# Patient Record
Sex: FEMALE | Race: WHITE | Employment: OTHER | URBAN - METROPOLITAN AREA
[De-identification: names, ages, dates, MRNs, and addresses within clinical notes are randomized per-mention and may not be internally consistent; named-entity substitution may affect disease eponyms.]

---

## 2023-02-06 ENCOUNTER — HOSPITAL ENCOUNTER (OUTPATIENT)
Dept: PHYSICAL THERAPY | Age: 78
Discharge: HOME | End: 2023-02-06
Payer: MEDICARE

## 2023-02-06 PROCEDURE — 97110 THERAPEUTIC EXERCISES: CPT

## 2023-02-06 PROCEDURE — 97016 VASOPNEUMATIC DEVICE THERAPY: CPT

## 2023-02-06 PROCEDURE — 97161 PT EVAL LOW COMPLEX 20 MIN: CPT

## 2023-02-06 NOTE — PROGRESS NOTES
PT INITIAL EVALUATION NOTE - [facility] 2-15    Patient Name: Nancy Garcia  Date:2023  : 1945  [x]  Patient  Verified  Payor: Prem Abbot / Plan: [address] [place] [address] HMO / Product Type: Managed Care Medicare /    In time: 2:40 p  Out time: 3:45 p  Total Treatment Time (min): 65  Total Timed Codes (min): 10  1:1 Treatment Time ( only): 10   Visit #: 1     Treatment Area: Right leg pain [M79.604]    SUBJECTIVE  Pain Level (0-10 scale): Current- 0, Best- 0, Worst- 5    Any medication changes, allergies to medications, adverse drug reactions, diagnosis change, or new procedure performed?: [] No    [x] Yes (see summary sheet for update)  Subjective:    Chief complaint: L leg pain. Pain is intermittent, starts at knee and travels up the side of her leg. 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 Her scar is lateral.  Pain began 3 weeks ago and is described as an aching. She has to use her cane when walking out of concern for pain. She was previously not using a SPC for ambulation after her surgery. She has found some exercises from the internet- quad sets and glute sets, hip extension which are helping her pain. She is having pain 4 days/week. Currently still doing her HEP s/p hip replacement- Ankle pumps, heels slides, clamshells, heel raises, hip abduction      Aggravating factors: walking  Easing factors: resting   Imaging/tests: denies  Numbness/tingling: denies     PLOF: Ambulating independently 1 mile/day   Mechanism of Injury: Insidious onset   Previous Treatment/Compliance: Previous PT following hip replacement   PMHx/Surgical Hx: s/p L THR 2022  Work Hx: Retired   Living Situation: Stairs at home without difficulty   Pt Goals: \"Being able to walk without a cane and not have the pain out of nowhere. \"  Barriers: None noted   Motivation: Motivated   Substance use: None noted   Cognition: A & O x 4        OBJECTIVE/EXAMINATION Gait: R varus, wide base gait  Functional Mobility:  Squat: reduced range, absent hip hinge   TTP: distal ITB    Sensation: Intact and equal bilaterally     MMT:     R  L  Hip flexion   4  3+  Knee extension 5  5   Knee flexion   4+  4  Ankle DF  4+  4  Ankle PF  5  4+  Hip ER   4  3+  Hip abduction   4  4  Hip extension   4  3    Lower Extremity AROM:        R  L  Hip Flexion  125  120  Knee Flexion  110  135  Knee Extension -8  -10        Balance:      R  L  SLS   21  30           Flexibility (restriction):     R  L  Hamstrings  Max  Mod   Gastrocnemius NT  NT   Iliopsoas  Mod  Max  Quadriceps  Mod  Max       Modality rationale: decrease inflammation and decrease pain to improve the patients ability to transfer, ambulate, perform ADL's   Min Type Additional Details    [] Estim: []Att   []Unatt        []TENS instruct                  []IFC  []Premod   []NMES                     []Other:  []w/US   []w/ice   []w/heat  Position:  Location:    []  Traction: [] Cervical       []Lumbar                       [] Prone          []Supine                       []Intermittent   []Continuous Lbs:  [] before manual  [] after manual  []w/heat    []  Ultrasound: []Continuous   [] Pulsed at:                           []1MHz   []3MHz Location:  W/cm2:    [] Paraffin         Location:   []w/heat    []  Ice     []  Heat  []  Ice massage Position:  Location:    []  Laser  []  Other: Position:  Location:   15   [x]  Vasopneumatic Device Pressure:       [x] lo [] med [] hi   Temperature: 34     [x] Skin assessment post-treatment:  [x]intact []redness- no adverse reaction    []redness - adverse reaction:     10 min Therapeutic Exercise:  [x] See flow sheet :   Rationale: increase ROM and increase strength to improve the patients ability to transfer, ambulate, perform ADL's             With   [] TE   [] TA   [] Neuro   [] SC   [] other: Patient Education: [x] Review HEP    [] Progressed/Changed HEP based on:   [] positioning   [] body mechanics   [] transfers   [] heat/ice application    [] other:      Other Objective/Functional Measures: FOTO Functional Measure: 55/100                Pain Level (0-10 scale) post treatment: 0    ASSESSMENT/Changes in Function:     [x]  See Plan of [facility], DPT 2/6/2023

## 2023-02-07 NOTE — THERAPY EVALUATION
Physical Therapy at [facility],   a part of  [facility]  [facility]  Isis Calloway  Phone: [phone]  Fax: [phone]    Plan of Care/Statement of Necessity for Physical Therapy Services  2-15    Patient name: Vanessa Whitfield  : 1945  Provider#: [id number]  Referral source: Briana Odom, [facility] Gopi Saenz      Medical/Treatment Diagnosis: Pain in left hip [M25.552]     Prior Hospitalization: see medical history     Comorbidities:  s/p L [facility] 2022  Prior Level of Function: Ambulating independently 1 mile/day   Medications: Verified on Patient Summary List  Start of Care: 23      Onset Date:    The Plan of Care and following information is based on the information from the initial evaluation. Assessment/ key information: Patient is a pleasant 69year old female presenting with L knee pain, sx suggestive of ITB syndrome 2/2 residual L hip weakness s/p TKR . Current symptoms limit functional ability to ambulate community distances. Marked deficits include L>R LE weakness, R>L knee flexion/extension AROM restriction, balance deficits and impaired gait/squatting mechanics. Patient will benefit from skilled PT to address all previously listed deficits. Evaluation Complexity History MEDIUM  Complexity : 1-2 comorbidities / personal factors will impact the outcome/ POC ; Examination MEDIUM Complexity : 3 Standardized tests and measures addressing body structure, function, activity limitation and / or participation in recreation  ;Presentation MEDIUM Complexity : Evolving with changing characteristics  ; Clinical Decision Making MEDIUM Complexity : FOTO score of 26-74  Overall Complexity Rating: LOW     Problem List: pain affecting function, decrease ROM, decrease strength, impaired gait/ balance, decrease ADL/ functional abilitiies, decrease activity tolerance, decrease flexibility/ joint mobility, and decrease transfer abilities   Treatment Plan may include any combination of the following: Therapeutic exercise, Neuromuscular reeducation, Manual therapy, Therapeutic activity, Self care/home management, Electric stim unattended , Vasopneumatic device, Gait training, Ultrasound, and Needle insertion w/o injection (1 or 2 muscles)  Patient / Family readiness to learn indicated by: asking questions, trying to perform skills, and interest  Persons(s) to be included in education: patient (P)  Barriers to Learning/Limitations: None  Patient Goal (s): Being able to walk without a cane and not have the pain out of nowhere  Patient Self Reported Health Status: good  Rehabilitation Potential: excellent    Short Term Goals: To be accomplished in 4 weeks:  Patient will be independent with initial HEP in order to transition to general wellness program and ease maneuvering stairs. Patient will demonstrate a full squat with correct mechanics to ease performance of household chores. Patient will demonstrate B SLS for 30 seconds to decrease fall risk when maneuvering stairs. Long Term Goals: To be accomplished in 12 weeks:  Patient will report worst pain of 2/10 or better to increase QOL and tolerance for sleeping through the night. Patient will be able to ambulate 300 feet without AD and no evidence for imbalance to increase access to the community. Patient will be able to squat and lift 10# to ease carrying groceries. Frequency / Duration: Patient to be seen 1-2 times per week for up to 12 weeks. Patient/ Caregiver education and instruction: self care, activity modification, and exercises    [x]  Plan of care has been reviewed with PTA      Certification Period: 2/6/23-5/6/23  James Weldon DPT 2/7/2023     ________________________________________________________________________    I certify that the above Therapy Services are being furnished while the patient is under my care.  I agree with the treatment plan and certify that this therapy is necessary.     [de-identified] Signature:____________________  Date:____________Time: _________         ASHISH Bolden

## 2023-02-16 ENCOUNTER — HOSPITAL ENCOUNTER (OUTPATIENT)
Dept: PHYSICAL THERAPY | Age: 78
Discharge: HOME | End: 2023-02-16
Payer: MEDICARE

## 2023-02-16 PROCEDURE — 97016 VASOPNEUMATIC DEVICE THERAPY: CPT

## 2023-02-16 PROCEDURE — 97112 NEUROMUSCULAR REEDUCATION: CPT

## 2023-02-16 PROCEDURE — 97110 THERAPEUTIC EXERCISES: CPT

## 2023-02-16 NOTE — PROGRESS NOTES
PT DAILY TREATMENT NOTE - [facility] 2-15    Patient Name: Danyelle Mcconnell  Date:2023  : 1945  [x]  Patient  Verified  Payor: Zohreh Machado / Plan: [address] [place] [address] HMO / Product Type: Managed Care Medicare /    In time: 9:40a  Out time: 10:40a  Total Treatment Time (min): 60  Total Timed Codes (min): 45  1:1 Treatment Time ( [address] only): 39   Visit #:  2    Treatment Area: Pain in left hip [M25.552]    SUBJECTIVE  Pain Level (0-10 scale): \"good\"  Any medication changes, allergies to medications, adverse drug reactions, diagnosis change, or new procedure performed?: [x] No    [] Yes (see summary sheet for update)  Subjective functional status/changes:   [] No changes reported  Patient reports yesterday she had a bad day with pain along the anterior thigh, but feels good today.     OBJECTIVE    Modality rationale: decrease inflammation and decrease pain to improve the patients ability to sit, stand, lift, carry, reach, ambulate, and complete ADL's   Min Type Additional Details       [] Estim: []Att   []Unatt    []TENS instruct                  []IFC  []Premod   []NMES                     []Other:  []w/US   []w/ice   []w/heat  Position:  Location:       []  Traction: [] Cervical       []Lumbar                       [] Prone          []Supine                       []Intermittent   []Continuous Lbs:  [] before manual  [] after manual  []w/heat    []  Ultrasound: []Continuous   [] Pulsed                       at: []1MHz   []3MHz Location:  W/cm2:    [] Paraffin         Location:   []w/heat    []  Ice     []  Heat  []  Ice massage Position:  Location:    []  Laser  []  Other: Position:  Location:   15   [x]  Vasopneumatic Device Pressure:       [x] lo [] med [] hi   Temperature: 34     [x] Skin assessment post-treatment:  [x]intact []redness- no adverse reaction    []redness - adverse reaction:     35 min Therapeutic Exercise:  [x] See flow sheet :   Rationale: increase ROM and increase strength to improve the patients ability to sit, stand, lift, carry, reach, ambulate, and complete ADL's      10 min Neuromuscular Re-education:  [x]  See flow sheet :   Rationale: improve coordination, improve balance, and increase proprioception  to improve the patients ability to sit, stand, lift, carry, reach, ambulate, and complete ADL's            With   [] TE   [] TA   [] Neuro   [] SC   [] other: Patient Education: [x] Review HEP    [] Progressed/Changed HEP based on:   [] positioning   [] body mechanics   [] transfers   [] heat/ice application    [] other:      Other Objective/Functional Measures:   No pain with advanced exercises, mod fatigue noted    Mod verbal cues for proper form      Pain Level (0-10 scale) post treatment: 0    ASSESSMENT/Changes in Function:     Patient will continue to benefit from skilled PT services to modify and progress therapeutic interventions, address functional mobility deficits, address ROM deficits, address strength deficits, analyze and address soft tissue restrictions, analyze and cue movement patterns, and analyze and modify body mechanics/ergonomics to attain remaining goals. []  See Plan of Care  []  See progress note/recertification  []  See Discharge Summary         Progress towards goals / Updated goals:  Patient demonstrates good overall tolerance for advanced interventions with mod fatigue. Patient will do well with continued progression as tolerated. Short Term Goals: To be accomplished in 4 weeks:  Patient will be independent with initial HEP in order to transition to general wellness program and ease maneuvering stairs. Patient will demonstrate a full squat with correct mechanics to ease performance of household chores. Patient will demonstrate B SLS for 30 seconds to decrease fall risk when maneuvering stairs. Long Term Goals:  To be accomplished in 12 weeks:  Patient will report worst pain of 2/10 or better to increase QOL and tolerance for sleeping through the night. Patient will be able to ambulate 300 feet without AD and no evidence for imbalance to increase access to the community.     Patient will be able to squat and lift 10# to ease carrying groceries    PLAN  [x]  Upgrade activities as tolerated     [x]  Continue plan of care  [x]  Update interventions per flow sheet       []  Discharge due to:_  []  Other:_      Sharath Duffy, PTA 2/16/2023

## 2023-02-22 ENCOUNTER — HOSPITAL ENCOUNTER (OUTPATIENT)
Dept: PHYSICAL THERAPY | Age: 78
Discharge: HOME | End: 2023-02-22
Payer: MEDICARE

## 2023-02-22 PROCEDURE — 97110 THERAPEUTIC EXERCISES: CPT

## 2023-02-22 PROCEDURE — 97112 NEUROMUSCULAR REEDUCATION: CPT

## 2023-02-22 PROCEDURE — 97016 VASOPNEUMATIC DEVICE THERAPY: CPT

## 2023-02-22 NOTE — PROGRESS NOTES
PT DAILY TREATMENT NOTE - [facility] 2-15    Patient Name: Ramu May  Date:2023  : 1945  [x]  Patient  Verified  Payor: Lala Carlisle / Plan: [address] [place] [address] HMO / Product Type: Managed Care Medicare /    In time: 10:02 a  Out time: 11:00 a  Total Treatment Time (min): 58  Total Timed Codes (min): 43  1:1 Treatment Time ( [address] only): 37   Visit #:  3    Treatment Area: Pain in left hip [M25.552]    SUBJECTIVE  Pain Level (0-10 scale): 0.5   Any medication changes, allergies to medications, adverse drug reactions, diagnosis change, or new procedure performed?: [x] No    [] Yes (see summary sheet for update)  Subjective functional status/changes:   [] No changes reported  Patient reports that she feels like she is getting better and stronger. She has some aching today because of the damp weather.      OBJECTIVE    Modality rationale: decrease inflammation and decrease pain to improve the patients ability to sit, stand, lift, carry, reach, ambulate, and complete ADL's   Min Type Additional Details       [] Estim: []Att   []Unatt    []TENS instruct                  []IFC  []Premod   []NMES                     []Other:  []w/US   []w/ice   []w/heat  Position:  Location:       []  Traction: [] Cervical       []Lumbar                       [] Prone          []Supine                       []Intermittent   []Continuous Lbs:  [] before manual  [] after manual  []w/heat    []  Ultrasound: []Continuous   [] Pulsed                       at: []1MHz   []3MHz Location:  W/cm2:    [] Paraffin         Location:   []w/heat    []  Ice     []  Heat  []  Ice massage Position:  Location:    []  Laser  []  Other: Position:  Location:   15   [x]  Vasopneumatic Device Pressure:       [x] lo [] med [] hi   Temperature: 34     [x] Skin assessment post-treatment:  [x]intact []redness- no adverse reaction    []redness - adverse reaction:     35 min Therapeutic Exercise:  [x] See flow sheet :   Rationale: increase ROM and increase strength to improve the patients ability to sit, stand, lift, carry, reach, ambulate, and complete ADL's      8 min Neuromuscular Re-education:  [x]  See flow sheet :   Rationale: improve coordination, improve balance, and increase proprioception  to improve the patients ability to sit, stand, lift, carry, reach, ambulate, and complete ADL's            With   [] TE   [] TA   [] Neuro   [] SC   [] other: Patient Education: [x] Review HEP    [] Progressed/Changed HEP based on:   [] positioning   [] body mechanics   [] transfers   [] heat/ice application    [] other:      Other Objective/Functional Measures: No pain throughout session     Pain Level (0-10 scale) post treatment: 0    ASSESSMENT/Changes in Function:     Patient will continue to benefit from skilled PT services to modify and progress therapeutic interventions, address functional mobility deficits, address ROM deficits, address strength deficits, analyze and address soft tissue restrictions, analyze and cue movement patterns, and analyze and modify body mechanics/ergonomics to attain remaining goals. []  See Plan of Care  []  See progress note/recertification  []  See Discharge Summary         Progress towards goals / Updated goals:  Patient demonstrating good initial progress toward goals     Short Term Goals: To be accomplished in 4 weeks:  Patient will be independent with initial HEP in order to transition to general wellness program and ease maneuvering stairs. Patient will demonstrate a full squat with correct mechanics to ease performance of household chores. Patient will demonstrate B SLS for 30 seconds to decrease fall risk when maneuvering stairs. Long Term Goals: To be accomplished in 12 weeks:  Patient will report worst pain of 2/10 or better to increase QOL and tolerance for sleeping through the night. Patient will be able to ambulate 300 feet without AD and no evidence for imbalance to increase access to the community. Patient will be able to squat and lift 10# to ease carrying groceries    PLAN  [x]  Upgrade activities as tolerated     [x]  Continue plan of care  [x]  Update interventions per flow sheet       []  Discharge due to:_  []  Other:_      Suzy Aranda DPT 2/22/2023

## 2023-03-01 ENCOUNTER — HOSPITAL ENCOUNTER (OUTPATIENT)
Dept: PHYSICAL THERAPY | Age: 78
Discharge: HOME | End: 2023-03-01
Payer: MEDICARE

## 2023-03-01 PROCEDURE — 97110 THERAPEUTIC EXERCISES: CPT | Performed by: PHYSICAL MEDICINE & REHABILITATION

## 2023-03-01 PROCEDURE — 97016 VASOPNEUMATIC DEVICE THERAPY: CPT | Performed by: PHYSICAL MEDICINE & REHABILITATION

## 2023-03-01 PROCEDURE — 97112 NEUROMUSCULAR REEDUCATION: CPT | Performed by: PHYSICAL MEDICINE & REHABILITATION

## 2023-03-01 NOTE — PROGRESS NOTES
PT DAILY TREATMENT NOTE - [facility] 2-15    Patient Name: Jeromy Lowery  Date:3/1/2023  : 1945  [x]  Patient  Verified  Payor: Eros Anthony / Plan: [address] [place] [address] HMO / Product Type: Managed Care Medicare /    In time: 10:00 a  Out time: 11:00 a  Total Treatment Time (min): 60  Total Timed Codes (min): 45  1:1 Treatment Time ( [address] only): 45   Visit #:  4    Treatment Area: Pain in left hip [M25.552]    SUBJECTIVE  Pain Level (0-10 scale): 0   Any medication changes, allergies to medications, adverse drug reactions, diagnosis change, or new procedure performed?: [x] No    [] Yes (see summary sheet for update)  Subjective functional status/changes:   [] No changes reported  Patient reports that she has been feeling good overall. Patient stated that when she carries heavier objects, it makes her knee pain worse.     OBJECTIVE    Modality rationale: decrease inflammation and decrease pain to improve the patients ability to sit, stand, lift, carry, reach, ambulate, and complete ADL's   Min Type Additional Details       [] Estim: []Att   []Unatt    []TENS instruct                  []IFC  []Premod   []NMES                     []Other:  []w/US   []w/ice   []w/heat  Position:  Location:       []  Traction: [] Cervical       []Lumbar                       [] Prone          []Supine                       []Intermittent   []Continuous Lbs:  [] before manual  [] after manual  []w/heat    []  Ultrasound: []Continuous   [] Pulsed                       at: []1MHz   []3MHz Location:  W/cm2:    [] Paraffin         Location:   []w/heat    []  Ice     []  Heat  []  Ice massage Position:  Location:    []  Laser  []  Other: Position:  Location:   15   [x]  Vasopneumatic Device Pressure:       [x] lo [] med [] hi   Temperature: 34     [x] Skin assessment post-treatment:  [x]intact []redness- no adverse reaction    []redness - adverse reaction:     35 min Therapeutic Exercise:  [x] See flow sheet :   Rationale: increase ROM and increase strength to improve the patients ability to sit, stand, lift, carry, reach, ambulate, and complete ADL's      10 min Neuromuscular Re-education:  [x]  See flow sheet :   Rationale: improve coordination, improve balance, and increase proprioception  to improve the patients ability to sit, stand, lift, carry, reach, ambulate, and complete ADL's            With   [] TE   [] TA   [] Neuro   [] SC   [] other: Patient Education: [x] Review HEP    [] Progressed/Changed HEP based on:   [] positioning   [] body mechanics   [] transfers   [] heat/ice application    [] other:      Other Objective/Functional Measures: Mod fatigue present with today's new exercises    Pain Level (0-10 scale) post treatment: 0    ASSESSMENT/Changes in Function:     Patient will continue to benefit from skilled PT services to modify and progress therapeutic interventions, address functional mobility deficits, address ROM deficits, address strength deficits, analyze and address soft tissue restrictions, analyze and cue movement patterns, and analyze and modify body mechanics/ergonomics to attain remaining goals. []  See Plan of Care  []  See progress note/recertification  []  See Discharge Summary         Progress towards goals / Updated goals:  Updated HEP    Short Term Goals: To be accomplished in 4 weeks:  Patient will be independent with initial HEP in order to transition to general wellness program and ease maneuvering stairs. Patient will demonstrate a full squat with correct mechanics to ease performance of household chores. Patient will demonstrate B SLS for 30 seconds to decrease fall risk when maneuvering stairs. Long Term Goals: To be accomplished in 12 weeks:  Patient will report worst pain of 2/10 or better to increase QOL and tolerance for sleeping through the night. Patient will be able to ambulate 300 feet without AD and no evidence for imbalance to increase access to the community.     Patient will be able to squat and lift 10# to ease carrying groceries    PLAN  [x]  Upgrade activities as tolerated     [x]  Continue plan of care  [x]  Update interventions per flow sheet       []  Discharge due to:_  []  Other:_      Jessica Mg PTA, REAGANA, CPT  3/1/2023

## 2023-03-08 ENCOUNTER — APPOINTMENT (OUTPATIENT)
Dept: PHYSICAL THERAPY | Age: 78
End: 2023-03-08
Payer: MEDICARE

## 2023-03-09 ENCOUNTER — HOSPITAL ENCOUNTER (OUTPATIENT)
Dept: PHYSICAL THERAPY | Age: 78
Discharge: HOME | End: 2023-03-09
Payer: MEDICARE

## 2023-03-09 PROCEDURE — 97110 THERAPEUTIC EXERCISES: CPT

## 2023-03-09 PROCEDURE — 97016 VASOPNEUMATIC DEVICE THERAPY: CPT

## 2023-03-09 PROCEDURE — 97112 NEUROMUSCULAR REEDUCATION: CPT

## 2023-03-09 NOTE — PROGRESS NOTES
PT DAILY TREATMENT NOTE - [facility] 2-15    Patient Name: Becka Cuevas  Date:3/9/2023  : 1945  [x]  Patient  Verified  Payor: Truman Booker / Plan: [address] [place] [address] HMO / Product Type: Managed Care Medicare /    In time: 9:30 a  Out time: 10:25  Total Treatment Time (min): 55  Total Timed Codes (min): 40  1:1 Treatment Time ( only): 40   Visit #:  5    Treatment Area: Pain in left hip [M25.552]    SUBJECTIVE  Pain Level (0-10 scale): 0   Any medication changes, allergies to medications, adverse drug reactions, diagnosis change, or new procedure performed?: [x] No    [] Yes (see summary sheet for update)  Subjective functional status/changes:   [] No changes reported  Patient reports that her leg is feeling better today. She \"threw out\" her shoulder while pulling weeds, but has been resting and icing and it is feeling better a well.       OBJECTIVE    Modality rationale: decrease inflammation and decrease pain to improve the patients ability to sit, stand, lift, carry, reach, ambulate, and complete ADL's   Min Type Additional Details       [] Estim: []Att   []Unatt    []TENS instruct                  []IFC  []Premod   []NMES                     []Other:  []w/US   []w/ice   []w/heat  Position:  Location:       []  Traction: [] Cervical       []Lumbar                       [] Prone          []Supine                       []Intermittent   []Continuous Lbs:  [] before manual  [] after manual  []w/heat    []  Ultrasound: []Continuous   [] Pulsed                       at: []1MHz   []3MHz Location:  W/cm2:    [] Paraffin         Location:   []w/heat   15, concurrent [x]  Ice     []  Heat  []  Ice massage Position: supine  Location: L shoulder     []  Laser  []  Other: Position:  Location:   15   [x]  Vasopneumatic Device Pressure:       [x] lo [] med [] hi   Temperature: 34     [x] Skin assessment post-treatment:  [x]intact []redness- no adverse reaction    []redness - adverse reaction:     30 min Therapeutic Exercise:  [x] See flow sheet :   Rationale: increase ROM and increase strength to improve the patients ability to sit, stand, lift, carry, reach, ambulate, and complete ADL's      10 min Neuromuscular Re-education:  [x]  See flow sheet :   Rationale: improve coordination, improve balance, and increase proprioception  to improve the patients ability to sit, stand, lift, carry, reach, ambulate, and complete ADL's            With   [] TE   [] TA   [] Neuro   [] SC   [] other: Patient Education: [x] Review HEP    [] Progressed/Changed HEP based on:   [] positioning   [] body mechanics   [] transfers   [] heat/ice application    [] other:      Other Objective/Functional Measures:   No increased shoulder pain throughout session, able to perform SL clamshells with modification     B SLS 30 seconds    Mod-max fatigue with sit to stands, hip abduction with band, and bike with resistance     Pain Level (0-10 scale) post treatment: 0    ASSESSMENT/Changes in Function:     Patient will continue to benefit from skilled PT services to modify and progress therapeutic interventions, address functional mobility deficits, address ROM deficits, address strength deficits, analyze and address soft tissue restrictions, analyze and cue movement patterns, and analyze and modify body mechanics/ergonomics to attain remaining goals. []  See Plan of Care  []  See progress note/recertification  []  See Discharge Summary         Progress towards goals / Updated goals:  Patient able to advance several exercises without pain or degradation of form    Short Term Goals: To be accomplished in 4 weeks:  Patient will be independent with initial HEP in order to transition to general wellness program and ease maneuvering stairs. Patient will demonstrate a full squat with correct mechanics to ease performance of household chores. Patient will demonstrate B SLS for 30 seconds to decrease fall risk when maneuvering stairs.       Long Term Goals: To be accomplished in 12 weeks:  Patient will report worst pain of 2/10 or better to increase QOL and tolerance for sleeping through the night. Patient will be able to ambulate 300 feet without AD and no evidence for imbalance to increase access to the community.     Patient will be able to squat and lift 10# to ease carrying groceries    PLAN  [x]  Upgrade activities as tolerated     [x]  Continue plan of care  []  Update interventions per flow sheet       []  Discharge due to:_  []  Other:_      Sumaya Chan DPT  3/9/2023

## 2023-03-15 ENCOUNTER — HOSPITAL ENCOUNTER (OUTPATIENT)
Dept: PHYSICAL THERAPY | Age: 78
Discharge: HOME | End: 2023-03-15
Payer: MEDICARE

## 2023-03-15 PROCEDURE — 97112 NEUROMUSCULAR REEDUCATION: CPT

## 2023-03-15 PROCEDURE — 97110 THERAPEUTIC EXERCISES: CPT

## 2023-03-15 NOTE — PROGRESS NOTES
PT DAILY TREATMENT NOTE - [facility] 2-15    Patient Name: Amrik Miles  Date:3/15/2023  : 1945  [x]  Patient  Verified  Payor: Virginia Fish / Plan: [address] [place] [address] HMO / Product Type: Managed Care Medicare /    In time: 10:00 a  Out time: 10:41 a  Total Treatment Time (min): 41  Total Timed Codes (min): 41  1:1 Treatment Time ( [address] only): 41  Visit #:  6    Treatment Area: Pain in left hip [M25.552]    SUBJECTIVE  Pain Level (0-10 scale): 0   Any medication changes, allergies to medications, adverse drug reactions, diagnosis change, or new procedure performed?: [x] No    [] Yes (see summary sheet for update)  Subjective functional status/changes:   [] No changes reported  Patient reports that she is feeling much better and has not had pain in a while. She is ready for discharge today.       OBJECTIVE    Modality rationale: decrease inflammation and decrease pain to improve the patients ability to sit, stand, lift, carry, reach, ambulate, and complete ADL's   Min Type Additional Details       [] Estim: []Att   []Unatt    []TENS instruct                  []IFC  []Premod   []NMES                     []Other:  []w/US   []w/ice   []w/heat  Position:  Location:       []  Traction: [] Cervical       []Lumbar                       [] Prone          []Supine                       []Intermittent   []Continuous Lbs:  [] before manual  [] after manual  []w/heat    []  Ultrasound: []Continuous   [] Pulsed                       at: []1MHz   []3MHz Location:  W/cm2:    [] Paraffin         Location:   []w/heat    [x]  Ice     []  Heat  []  Ice massage Position: supine  Location: L shoulder     []  Laser  []  Other: Position:  Location:   declined   [x]  Vasopneumatic Device Pressure:       [x] lo [] med [] hi   Temperature: 34     [x] Skin assessment post-treatment:  [x]intact []redness- no adverse reaction    []redness - adverse reaction:     30 min Therapeutic Exercise:  [x] See flow sheet :   Rationale: increase ROM and increase strength to improve the patients ability to sit, stand, lift, carry, reach, ambulate, and complete ADL's      11 min Neuromuscular Re-education:  [x]  See flow sheet :   Rationale: improve coordination, improve balance, and increase proprioception  to improve the patients ability to sit, stand, lift, carry, reach, ambulate, and complete ADL's            With   [] TE   [] TA   [] Neuro   [] SC   [] other: Patient Education: [x] Review HEP    [] Progressed/Changed HEP based on:   [] positioning   [] body mechanics   [] transfers   [] heat/ice application    [] other:      Other Objective/Functional Measures:   MMT:                                      R                      L  Hip flexion                   4+                      4+  Hip ER                         4                      4-  Hip abduction              4                      4+  Hip extension              4                      3+     Lower Extremity AROM:                                                                    R                      L  Knee Flexion               114                  135                                                                                            Balance:                                       R                      L  SLS                             30                    30                                                                                               Pain Level (0-10 scale) post treatment: 0    ASSESSMENT/Changes in Function:      []  See Plan of Care  []  See progress note/recertification  [x]  See Discharge Summary         Progress towards goals / Updated goals:    Short Term Goals: To be accomplished in 4 weeks:  Patient will be independent with initial HEP in order to transition to general wellness program and ease maneuvering stairs. MET  Patient will demonstrate a full squat with correct mechanics to ease performance of household chores.  MET  Patient will demonstrate B SLS for 30 seconds to decrease fall risk when maneuvering stairs. MET     Long Term Goals: To be accomplished in 12 weeks:  Patient will report worst pain of 2/10 or better to increase QOL and tolerance for sleeping through the night. MET  Patient will be able to ambulate 300 feet without AD and no evidence for imbalance to increase access to the community. MET  Patient will be able to squat and lift 10# to ease carrying groceries.  MET    PLAN  []  Upgrade activities as tolerated     []  Continue plan of care  []  Update interventions per flow sheet       [x]  Discharge due to: goals met   []  Other:_      Danyelle Shah DPT  3/15/2023

## 2023-03-15 NOTE — PROGRESS NOTES
Physical Therapy at [facility],   a part of  [facility]  [place]  [facility] Stefan [facility]  Phone: [phone]      Fax:  [phone]    Progress Note    Name: Lady Huggins   : 1945   MD: Aurora Stephens [place]       Treatment Diagnosis: Pain in left hip [M25.552]  Start of Care: 23    Visits from Start of Care: 5  Missed Visits: 0    Summary of Care: therapeutic exercise, neuromuscular re-education     Assessment / Recommendations: At time of reassessment, patient reports significant improvement in pain and function. She has improved her LE strength, single leg balance, and functional capacity to transfer from chairs and maneuver stairs. She has met all goals at this time and will benefit from 1 additional visit to consolidate and review HEP. Short Term Goals: To be accomplished in 4 weeks:  Patient will be independent with initial HEP in order to transition to general wellness program and ease maneuvering stairs. MET  Patient will demonstrate a full squat with correct mechanics to ease performance of household chores. MET  Patient will demonstrate B SLS for 30 seconds to decrease fall risk when maneuvering stairs. MET     Long Term Goals: To be accomplished in 12 weeks:  Patient will report worst pain of 2/10 or better to increase QOL and tolerance for sleeping through the night. MET  Patient will be able to ambulate 300 feet without AD and no evidence for imbalance to increase access to the community. MET  Patient will be able to squat and lift 10# to ease carrying groceries.  MET      Ramirez Mock DPT 3/15/2023

## 2023-03-15 NOTE — PROGRESS NOTES
Physical Therapy at [facility],   a part of  [facility]  [address] Darren Lozada [address]  Phone: [phone] Fax: [phone]      Discharge Summary 2-15    Patient name: Delfina Beyer  : 1945  Provider#: [id number]  Referral source: Alexandra Gusman [place]      Medical/Treatment Diagnosis: Pain in left hip [M25.552]     Prior Hospitalization: see medical history     Comorbidities: See Plan of Care  Prior Level of Function: See Plan of Care  Medications: Verified on Patient Summary List    Start of Care: 23      Onset Date:   Visits from Start of Care: 6     Missed Visits: 0  Reporting Period : 23 to 3/15/23    Assessment/Summary of care: At time of discharge, patient has met all goals and reports no pain over the last few weeks. She is now able to arise from a chair without arm rests and maneuver stairs without pain. She improved her FOTO outcome measure for LE function from 58% at initial evaluation to 82% today. She has maximized therapeutic benefit at this time. Short Term Goals: To be accomplished in 4 weeks:  Patient will be independent with initial HEP in order to transition to general wellness program and ease maneuvering stairs. MET  Patient will demonstrate a full squat with correct mechanics to ease performance of household chores. MET  Patient will demonstrate B SLS for 30 seconds to decrease fall risk when maneuvering stairs. MET     Long Term Goals: To be accomplished in 12 weeks:  Patient will report worst pain of 2/10 or better to increase QOL and tolerance for sleeping through the night. MET  Patient will be able to ambulate 300 feet without AD and no evidence for imbalance to increase access to the community. MET  Patient will be able to squat and lift 10# to ease carrying groceries.  MET        RECOMMENDATIONS:  [x]Discontinue therapy: [x]Patient has reached or is progressing toward set goals     []Patient is non-compliant or has abdicated     []Due to lack of appreciable progress towards set goals     []Gregory Kyle DPT 3/15/2023

## 2023-03-28 ENCOUNTER — TRANSCRIBE ORDER (OUTPATIENT)
Dept: SCHEDULING | Age: 78
End: 2023-03-28

## 2023-03-28 DIAGNOSIS — M19.012 OSTEOARTHRITIS OF LEFT SHOULDER, UNSPECIFIED OSTEOARTHRITIS TYPE: Primary | ICD-10-CM

## 2023-03-28 DIAGNOSIS — S42.145A CLOSED NONDISPLACED FRACTURE OF GLENOID CAVITY OF LEFT SCAPULA, INITIAL ENCOUNTER: ICD-10-CM

## 2023-04-06 ENCOUNTER — HOSPITAL ENCOUNTER (OUTPATIENT)
Dept: CT IMAGING | Age: 78
End: 2023-04-06
Attending: PHYSICIAN ASSISTANT
Payer: MEDICARE

## 2023-04-06 PROCEDURE — 73200 CT UPPER EXTREMITY W/O DYE: CPT

## 2023-04-19 ENCOUNTER — HOSPITAL ENCOUNTER (OUTPATIENT)
Dept: PHYSICAL THERAPY | Age: 78
Discharge: HOME | End: 2023-04-19
Payer: MEDICARE

## 2023-04-19 PROCEDURE — 97140 MANUAL THERAPY 1/> REGIONS: CPT

## 2023-04-19 PROCEDURE — 97110 THERAPEUTIC EXERCISES: CPT

## 2023-04-19 NOTE — PROGRESS NOTES
PT DAILY TREATMENT NOTE - [facility] 2-15    Patient Name: Michael Mar  Date:2023  : 1945  [x]  Patient  Verified  Payor: John Castrol / Plan: [address] [place] [address] HMO / Product Type: Managed Care Medicare /    In time: 3:34 p  Out time: 4:15 p  Total Treatment Time (min): 41  Total Timed Codes (min): 41  1:1 Treatment Time ( [address] only): 41   Visit #:  2    Treatment Area: Pain in left shoulder [M25.512]    SUBJECTIVE  Pain Level (0-10 scale): 0  Any medication changes, allergies to medications, adverse drug reactions, diagnosis change, or new procedure performed?: [x] No    [] Yes (see summary sheet for update)  Subjective functional status/changes:   [] No changes reported  Patient reports that she has been feeling much better. She has been relaxing and that has been helping tremendously. OBJECTIVE    33 min Therapeutic Exercise:  [x] See flow sheet :   Rationale: increase ROM and increase strength to improve the patients ability to reach, dress, lift, perform ADL's     8 min Manual Therapy: PROM abduction to tolerance, posterior and inferior GH glides     Rationale: decrease pain, increase ROM, and increase tissue extensibility to improve the patients ability to reach, dress, lift, perform ADL's           With   [] TE   [] TA   [] Neuro   [] SC   [] other: Patient Education: [x] Review HEP    [] Progressed/Changed HEP based on:   [] positioning   [] body mechanics   [] transfers   [] heat/ice application    [] other:      Other Objective/Functional Measures: L shoulder flexion finger ladder 26    Max stretch noted with doorway pec stretch, required cues to reduce stress     No increased pain throughout session     Pain Level (0-10 scale) post treatment: 0    ASSESSMENT/Changes in Function:   Patient with excellent participation in therapy and tolerance for advanced exercises. HEP updated.    Patient will continue to benefit from skilled PT services to modify and progress therapeutic interventions, address functional mobility deficits, address ROM deficits, address strength deficits, analyze and address soft tissue restrictions, analyze and cue movement patterns, analyze and modify body mechanics/ergonomics, and assess and modify postural abnormalities to attain remaining goals. []  See Plan of Care  []  See progress note/recertification  []  See Discharge Summary         Progress towards goals / Updated goals:   Independent with HEP at initial follow up visit    PLAN  [x]  Upgrade activities as tolerated     [x]  Continue plan of care  []  Update interventions per flow sheet       []  Discharge due to:_  []  Other:_      Noemy Doty DPT 4/19/2023

## 2023-04-26 ENCOUNTER — HOSPITAL ENCOUNTER (OUTPATIENT)
Dept: PHYSICAL THERAPY | Age: 78
Discharge: HOME | End: 2023-04-26
Payer: MEDICARE

## 2023-04-26 PROCEDURE — 97110 THERAPEUTIC EXERCISES: CPT

## 2023-04-26 PROCEDURE — 97140 MANUAL THERAPY 1/> REGIONS: CPT

## 2023-04-26 NOTE — PROGRESS NOTES
PT DAILY TREATMENT NOTE - [facility] 2-15    Patient Name: Sarah Owens  Date:2023  : 1945  [x]  Patient  Verified  Payor: Felipe Right / Plan: [address] [place] [address] HMO / Product Type: Managed Care Medicare /    In time: 9:10 a  Out time: 9:51 a  Total Treatment Time (min): 41  Total Timed Codes (min): 41  1:1 Treatment Time ( [address] only): 41   Visit #:  3    Treatment Area: Pain in left shoulder [M25.512]    SUBJECTIVE  Pain Level (0-10 scale): 0  Any medication changes, allergies to medications, adverse drug reactions, diagnosis change, or new procedure performed?: [x] No    [] Yes (see summary sheet for update)  Subjective functional status/changes:   [] No changes reported  Patient reports that she was sore after her last session but did not have more pain. She plans to pull some weeds this weekend and is excited that she will be able to. \"I couldn't have done that a month ago. \"    OBJECTIVE    33 min Therapeutic Exercise:  [x] See flow sheet :   Rationale: increase ROM and increase strength to improve the patients ability to reach, dress, lift, perform ADL's     8 min Manual Therapy: PROM abduction to tolerance, posterior and inferior GH glides, scapular mobilization all directions in R SL   Rationale: decrease pain, increase ROM, and increase tissue extensibility to improve the patients ability to reach, dress, lift, perform ADL's           With   [] TE   [] TA   [] Neuro   [] SC   [] other: Patient Education: [x] Review HEP    [] Progressed/Changed HEP based on:   [] positioning   [] body mechanics   [] transfers   [] heat/ice application    [] other:      Other Objective/Functional Measures: L shoulder flexion finger ladder 28    Good tolerance for supine pec stretch, added to HEP    Pain Level (0-10 scale) post treatment: 0    ASSESSMENT/Changes in Function:   Patient with excellent participation in therapy and tolerance for advanced exercises. HEP updated.    Patient will continue to benefit from skilled PT services to modify and progress therapeutic interventions, address functional mobility deficits, address ROM deficits, address strength deficits, analyze and address soft tissue restrictions, analyze and cue movement patterns, analyze and modify body mechanics/ergonomics, and assess and modify postural abnormalities to attain remaining goals. []  See Plan of Care  []  See progress note/recertification  []  See Discharge Summary         Progress towards goals / Updated goals:  Patient able to perform increased repetitions of exercises without excessive fatigue and no pain onset.       PLAN  [x]  Upgrade activities as tolerated     [x]  Continue plan of care  []  Update interventions per flow sheet       []  Discharge due to:_  []  Other:_      Leeland Cowden, DPT 4/26/2023

## 2023-05-01 ENCOUNTER — APPOINTMENT (OUTPATIENT)
Facility: HOSPITAL | Age: 78
End: 2023-05-01
Payer: MEDICARE

## 2023-05-03 ENCOUNTER — HOSPITAL ENCOUNTER (OUTPATIENT)
Dept: PHYSICAL THERAPY | Age: 78
Discharge: HOME | End: 2023-05-03
Payer: MEDICARE

## 2023-05-03 PROCEDURE — 97110 THERAPEUTIC EXERCISES: CPT

## 2023-05-03 PROCEDURE — 9990 CHARGE CONVERSION

## 2023-05-10 ENCOUNTER — HOSPITAL ENCOUNTER (OUTPATIENT)
Facility: HOSPITAL | Age: 78
Setting detail: RECURRING SERIES
Discharge: HOME | End: 2023-05-13
Payer: MEDICARE

## 2023-05-10 ENCOUNTER — APPOINTMENT (OUTPATIENT)
Dept: PHYSICAL THERAPY | Age: 78
End: 2023-05-10
Payer: MEDICARE

## 2023-05-10 PROCEDURE — 97110 THERAPEUTIC EXERCISES: CPT

## 2023-05-10 NOTE — PROGRESS NOTES
Physical Therapy at [facility],   a part of  [facility]  [address] [facility]  Phone: [phone]  Fax: [phone]  PHYSICAL THERAPY PROGRESS NOTE  Patient Name:  Naomy Layne :  1945   Treatment/Medical Diagnosis: Pain in left shoulder [M25.512]   Referral Source:  Maria Esther [place]     Date of Initial Visit:  4/10/23 Attended Visits:  5 Missed Visits:  0     SUMMARY OF TREATMENT/ASSESSMENT:  At time of reassessment, patient has met all goals and is ready to transition to independence. She is able to reach above her head for grooming and home care chores and demonstrates excellent independence in exercise performance. She was instructed in BUE stretching and strengthening exercises and postural awareness. Plan to hold case open 1 month to allow return as needed. CURRENT STATUS      Short Term Goals: To be accomplished in 4 weeks:  Patient will be independent with initial HEP in order to transition to general wellness program. MET  Patient will demonstrate L shoulder flexion AROM to 120 or better without pain to ease overhead reaching for dressing and grooming activities. MET  Patient will demonstrate L shoulder ER strength of 4/5 or better to ease driving, cleaning, and making her bed without pain or modification. MET     Long Term Goals: To be accomplished in 12 weeks:  Patient will report worst pain no greater than 1/10 to increase QOL. MET  2. Patient will demonstrate full L shoulder AROM WNL to ease reaching overhead for grooming activities. MET  3. Patient will demonstrate gross UE strength of 4+/5 or better to ease carrying and lifting groceries. MET        RECOMMENDATIONS  Transition to independence at this time, holding case open to allow prn return for 1 month.           Anna Rodriguez PT . DPT      5/10/2023       10:02 AM    If you have any questions/comments please contact us directly at
Extremity AROM:                                                                                 R                      L  Shoulder Flexion                     135  130                                       Shoulder Abduction                145                 133                                                                                                                                                            MMT:                                                  R                      L  Shoulder IR                             5                     5   Shoulder ER                            5                  4+    FOTO: 63%, improved from 43%    Pain Level at end of session (0-10 scale): 0      Assessment   See progress note. Patient ready for discharge at this time, holding case open 1 month to allow return as necessary. Progress toward goals / Updated goals:  [x]  See Progress Note/Recertification    Short Term Goals: To be accomplished in 4 weeks:  Patient will be independent with initial HEP in order to transition to general wellness program. MET  Patient will demonstrate L shoulder flexion AROM to 120 or better without pain to ease overhead reaching for dressing and grooming activities. MET  Patient will demonstrate L shoulder ER strength of 4/5 or better to ease driving, cleaning, and making her bed without pain or modification. MET     Long Term Goals: To be accomplished in 12 weeks:  Patient will report worst pain no greater than 1/10 to increase QOL. MET  2. Patient will demonstrate full L shoulder AROM WNL to ease reaching overhead for grooming activities. MET  3. Patient will demonstrate gross UE strength of 4+/5 or better to ease carrying and lifting groceries.  MET      PLAN  Yes  Continue plan of care  Re-Cert Due: 3/72/01  []  Upgrade activities as tolerated  []  Discharge due to :  [x]  Other: hold open 1 month       Seth Whiting PT , DPT      5/10/2023       9:10 AM

## 2023-05-17 ENCOUNTER — APPOINTMENT (OUTPATIENT)
Facility: HOSPITAL | Age: 78
End: 2023-05-17
Payer: MEDICARE

## 2023-05-17 ENCOUNTER — APPOINTMENT (OUTPATIENT)
Dept: PHYSICAL THERAPY | Age: 78
End: 2023-05-17
Payer: MEDICARE